# Patient Record
Sex: MALE | Race: WHITE | Employment: PART TIME | URBAN - METROPOLITAN AREA
[De-identification: names, ages, dates, MRNs, and addresses within clinical notes are randomized per-mention and may not be internally consistent; named-entity substitution may affect disease eponyms.]

---

## 2018-07-28 ENCOUNTER — HOSPITAL ENCOUNTER (EMERGENCY)
Age: 18
Discharge: HOME | End: 2018-07-28
Attending: EMERGENCY MEDICINE

## 2018-07-28 VITALS
TEMPERATURE: 98.8 F | HEART RATE: 97 BPM | DIASTOLIC BLOOD PRESSURE: 75 MMHG | HEIGHT: 71 IN | OXYGEN SATURATION: 100 % | SYSTOLIC BLOOD PRESSURE: 158 MMHG | RESPIRATION RATE: 16 BRPM

## 2018-07-28 DIAGNOSIS — B09 VIRAL EXANTHEM: Primary | ICD-10-CM

## 2018-07-28 PROCEDURE — 99282 EMERGENCY DEPT VISIT SF MDM: CPT

## 2018-07-28 NOTE — ED PROVIDER NOTES
158/75      Heart Rate 97      Resp 16      Temp 98.8 °F (37.1 °C)      Temp Source Oral      SpO2 100 %      Weight       Height 5' 11\" (1.803 m)      Head Circumference       Peak Flow       Pain Score       Pain Loc       Pain Edu? Excl. in [address]? GENERAL APPEARANCE: Awake and alert. Cooperative. No acute distress. He is sitting up for examination. HEAD: Normocephalic. Atraumatic. EYES: EOM's grossly intact. Sclera anicteric. PEERL  ENT: Mucous membranes are moist. Tolerates saliva. No trismus. Posterior pharynx is inflamed. NECK: Supple. No meningismus. Trachea midline. No JVD. No lymphadenopathy. HEART: RRR. Radial pulses 2+. Heart without murmur. LUNGS: Respirations unlabored. CTAB  ABDOMEN: Soft. Non-tender. No guarding or rebound. No CVAT. EXTREMITIES: No acute deformities. Hand  are equal.  No joint swelling. No lower extremity calf pain or edema. SKIN: Warm and dry. There is a fine macular rash on the forehead and in the axilla and groin bilaterally. There are macular areas on the palms and soles. No vesicles seen. No palpable purpura. NEUROLOGICAL:   GCS 15. Cranial nerves intact. DTRs are equal.  Moves all 4 extremities spontaneously. No focal motor or sensory abnormalities of the upper or lower extremities. PSYCHIATRIC: Normal mood. I have reviewed and interpreted all of the currently available lab results from this visit (if applicable):  No results found for this visit on 07/28/18. Radiographs (if obtained):  [] The following radiograph was interpreted by myself in the absence of a radiologist:  [x] Radiologist's Report Reviewed:      EKG (if obtained): (All EKG's are interpreted by myself in the absence of a cardiologist)    MDM:  He is placed in room and examined. He is afebrile. Vital signs are normal.  Rash appears to be viral in nature. Rash easily blanches on light pressure.   With lesions on the palms and soles the differential includes HFMD, Lyme and

## 2018-07-28 NOTE — ED NOTES
Chief Complaint   Patient presents with    Rash     pt states that he started having a rash in his armpits and groin about 3 days ago. it has now spread to his feet, hands and around his mouth. Pt placed in room 12 in gown. Bed in low position, call light in reach. Will continue to monitor.       Cathleen Miller RN  07/28/18 [id number]